# Patient Record
Sex: FEMALE | Race: OTHER | NOT HISPANIC OR LATINO
[De-identification: names, ages, dates, MRNs, and addresses within clinical notes are randomized per-mention and may not be internally consistent; named-entity substitution may affect disease eponyms.]

---

## 2018-05-16 PROBLEM — Z00.00 ENCOUNTER FOR PREVENTIVE HEALTH EXAMINATION: Status: ACTIVE | Noted: 2018-05-16

## 2018-05-18 ENCOUNTER — APPOINTMENT (OUTPATIENT)
Dept: OTOLARYNGOLOGY | Facility: CLINIC | Age: 48
End: 2018-05-18
Payer: COMMERCIAL

## 2018-05-18 VITALS
DIASTOLIC BLOOD PRESSURE: 71 MMHG | BODY MASS INDEX: 27.23 KG/M2 | WEIGHT: 148 LBS | HEIGHT: 62 IN | SYSTOLIC BLOOD PRESSURE: 106 MMHG | OXYGEN SATURATION: 99 % | TEMPERATURE: 98.2 F | HEART RATE: 61 BPM

## 2018-05-18 DIAGNOSIS — D57.3 SICKLE-CELL TRAIT: ICD-10-CM

## 2018-05-18 PROCEDURE — 31575 DIAGNOSTIC LARYNGOSCOPY: CPT

## 2018-05-18 PROCEDURE — 99204 OFFICE O/P NEW MOD 45 MIN: CPT | Mod: 25

## 2018-05-18 PROCEDURE — 76536 US EXAM OF HEAD AND NECK: CPT

## 2018-06-07 ENCOUNTER — APPOINTMENT (OUTPATIENT)
Dept: OTOLARYNGOLOGY | Facility: CLINIC | Age: 48
End: 2018-06-07
Payer: COMMERCIAL

## 2018-06-07 VITALS
RESPIRATION RATE: 15 BRPM | OXYGEN SATURATION: 98 % | SYSTOLIC BLOOD PRESSURE: 113 MMHG | DIASTOLIC BLOOD PRESSURE: 79 MMHG | HEART RATE: 96 BPM | TEMPERATURE: 98.9 F

## 2018-06-07 DIAGNOSIS — R22.1 LOCALIZED SWELLING, MASS AND LUMP, NECK: ICD-10-CM

## 2018-06-07 PROCEDURE — 99214 OFFICE O/P EST MOD 30 MIN: CPT | Mod: 25

## 2018-06-07 PROCEDURE — 10022: CPT

## 2018-06-07 PROCEDURE — 76942 ECHO GUIDE FOR BIOPSY: CPT

## 2018-07-12 ENCOUNTER — APPOINTMENT (OUTPATIENT)
Dept: OTOLARYNGOLOGY | Facility: CLINIC | Age: 48
End: 2018-07-12

## 2020-07-27 ENCOUNTER — APPOINTMENT (OUTPATIENT)
Dept: OTOLARYNGOLOGY | Facility: CLINIC | Age: 50
End: 2020-07-27
Payer: COMMERCIAL

## 2020-07-27 VITALS
HEART RATE: 58 BPM | DIASTOLIC BLOOD PRESSURE: 76 MMHG | OXYGEN SATURATION: 100 % | TEMPERATURE: 98.3 F | SYSTOLIC BLOOD PRESSURE: 119 MMHG

## 2020-07-27 PROCEDURE — 99214 OFFICE O/P EST MOD 30 MIN: CPT | Mod: 25

## 2020-07-27 PROCEDURE — 31575 DIAGNOSTIC LARYNGOSCOPY: CPT

## 2020-07-27 PROCEDURE — 10005 FNA BX W/US GDN 1ST LES: CPT

## 2020-07-27 NOTE — REASON FOR VISIT
[FreeTextEntry2] : a follow up regarding need for thyroidectomy for an enlarging goiter and left sided complex nodule associated with thyroiditis.  [FreeTextEntry1] : PCP is MD CLEO Shields, Endocrinologist is Jace Damian MD

## 2020-07-27 NOTE — PROCEDURE
[Image(s) Captured] : image(s) captured and filed [Unable to Cooperate with Mirror] : patient unable to cooperate with mirror [Gag Reflex] : gag reflex preventing mirror examination [Topical Lidocaine] : topical lidocaine [Flexible Endoscope] : examined with the flexible endoscope [Oxymetazoline HCl] : oxymetazoline HCl [Serial Number: ___] : Serial Number: [unfilled] [FreeTextEntry3] : ..............................................[facility]\par ........................................ULTRASOUND-GUIDED THYROID FINE NEEDLE ASPIRATION BIOPSY REPORT\par \par NAME: BEE SCHNEIDER.  MR# [id number]                : 1970               DATE: 2020  TIME: 6:10 PM\par \par HISTORY/ INDICATIONS: 49- year-old female with a new left lower lobe solid nodule with smooth margins and punctate echogenic foci within a multinodular goiter.\par \par EXAM: Real-time high-resolution ultrasound imaging of the thyroid gland was performed in the longitudinal and transverse planes with color power Doppler supplementation and image capture.\par \par FINDINGS: A left lower lobe solid nodule measuring 3.00 x 1.73  x  2.90 cm (longitudinal x AP x transverse) was identified and targeted for USG-FNA.  The nodule had the following ultrasonographic characteristics: solid, mildly hypoechoic, heterogeneous, smooth  margins, multiple punctate echogenic foci, grade 3 vascularity on color Doppler, and wider-than-tall.\par \par PROCEDURE: A time out took place and documented for correct patient identifiers, site and side of procedure.  After obtaining informed consent with discussion of risks, benefits and alternatives, the patient was positioned semi-supine, the skin was prepped with alcohol and 0.5 cc of 1% Lidocaine with 1:100,000 epinephrine was injected for local anesthesia. A #25-gauge needle was then passed along the perpendicular plane of the transducer, positioned within the nodule and confirmed with ultrasonography.  Multiple aspirations were made within the nodule with 4 separate needle punctures, four passes each.  Aspirates were directly placed into cytolyt solutions for cytologic analysis, immunohistochemistry, and in RNA preservative for possible molecular genomic diagnostics.  The patient tolerated the procedure well without complications and was discharged with signed post-procedure instructions indicating the importance of following up on all results. \par \par ASSESSMENT & PLAN: Successful USG-FNA of a left lower lobe complex nodule was well tolerated without complications. The patient will be contacted to review the cytologic findings as soon as available for further treatment planning.  A discussion took place with the patient who accepted the responsibility to call the office to review the cytology results if no communication occurs within two weeks. Follow-up imaging in 1 year is recommended if the cytology is reported as benign, [place] Category II.\par \par Electronically signed by MONTSERRAT LANDON M.D., FACS on 2020, 6:18 PM.\par \par [facility]: [address] [place] [address], [address],  [place] [address]\par [phone] (voice), [phone] (fax) [de-identified] : The nasal septum is minimally deviated to the right. There are no masses or polyps and the nasal mucosa and secretions are normal. The turbinates are congested. The choanae and posterior nasopharynx are normal without masses or drainage. The Eustachian tube orifices appear patent. The pharynx, including the posterior and lateral pharyngeal walls, the vallecula and base of tongue are normal without ulcerations, lesions or masses. The hypopharynx including the pyriform sinuses open well without pooling of secretions, mucosal lesions or masses. The supraglottic larynx including the epiglottis, petiole, glossoepiglottic folds and pharyngoepiglottic folds are normal without mucosal lesions, ulcerations or masses. The glottis reveals normal false vocal folds. The true vocal folds are glistening white, tense and of equal length, without paralysis, having symmetric mobility on adduction and abduction. There are no mucosal lesions, nodules, cysts, erythroplasia or leukoplakia. The posterior cricoid area has healthy pink mucosa in the interarytenoid area and esophageal inlet. There is mild thickening/edema of the interarytenoid mucosa suggestive of posterior laryngitis from laryngopharyngeal acid reflux disease. The trachea is clear without narrowing in the immediate subglottic region, without deviation or lesions. \par  [de-identified] : goiter and thyroiditis, GERD.

## 2020-07-27 NOTE — HISTORY OF PRESENT ILLNESS
[FreeTextEntry1] : Mindy had an ultrasound guided FNA biopsy of the dominant left mid-lower lobe complex nodule with ill-defined margins and several punctate echogenic foci identified on her last ultrasound exam in the office on 06/07/18. Her most recent f/u ultrasound again demonstrated numerous bilateral nodules but very difficult to compare to prior exams and the possibility of a new left lobe nodule > 2.5 cm with echogenic foci.  Mindy denies recent shortness of breath, voice changes, dysphagia, anterior neck pain, neck pressure or mass. She does admit to a intermittent choking sensation when lying flat over the past 2 years.  She is euthyroid. She denies fever, body aches, cough, cyanosis, chest burning, anosmia or recent known COVID exposures.  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  She was referred to Jace Damian for Endocrine consultation and he found a slight bilaterally enlarged thyroid gland with a "tender left lobe complex nodule" and no other focal solid or cystic nodule elsewhere in either lobe.  She had an USG FNA biopsy reported as benign, [place] II.   She has elevated anti-TPO antibodies and a mildly suppressed TSH in the past. Her weight has been stable. She is not taking thyroid hormone replacement.  Mindy denies recent shortness of breath, voice changes, dysphagia, anterior neck pain, neck pressure or mass. There is no family history of thyroid cancer. She denies any known radiation exposures in her youth. For the past several months she has had increased nasal congestion.  \par

## 2020-07-27 NOTE — DATA REVIEWED
[de-identified] : cytology report and Endocrine notes reviewed.  [de-identified] : see HPI [de-identified] : see HPI

## 2020-07-27 NOTE — CONSULT LETTER
[Consult Letter:] : I had the pleasure of evaluating your patient, [unfilled]. [Dear  ___] : Dear  [unfilled], [Sincerely,] : Sincerely, [Consult Closing:] : Thank you very much for allowing me to participate in the care of this patient.  If you have any questions, please do not hesitate to contact me. [Please see my note below.] : Please see my note below. [Dawood Cyr M.D., FACS, KAILA] : Dawood Cyr M.D., FACS, [facility] [Director, Center for Thyroid & Parathyroid Surgery] : Director, [facility] [[facility]] : [facility] [[facility]] : [facility]  [Certified in Neck Ultrasound/ ECNU & AIUM] : Certified in Neck Ultrasound/ ECNU & AIUM [[facility]] : [facility] [Otolaryngology/Head & Neck Surgery] : Otolaryngology/Head & Neck Surgery [] :  [DrWandy  ___] : Dr. AUGUSTINE [FreeTextEntry3] : \par Dawood Cyr M.D., FACS, [facility]\par Director Center for Thyroid & Parathyroid Surgery\par [facility] [place] [facility]\par Certified in Thyroid/Parathyroid/Neck Ultrasound, ECNU/ AIUM\par \par , Department of Otolaryngology\par [facility] at [facility]\par

## 2020-08-25 ENCOUNTER — APPOINTMENT (OUTPATIENT)
Dept: OTOLARYNGOLOGY | Facility: CLINIC | Age: 50
End: 2020-08-25

## 2021-08-03 ENCOUNTER — APPOINTMENT (OUTPATIENT)
Dept: OTOLARYNGOLOGY | Facility: CLINIC | Age: 51
End: 2021-08-03
Payer: COMMERCIAL

## 2021-08-03 VITALS
WEIGHT: 153 LBS | HEIGHT: 62 IN | DIASTOLIC BLOOD PRESSURE: 74 MMHG | OXYGEN SATURATION: 99 % | SYSTOLIC BLOOD PRESSURE: 105 MMHG | RESPIRATION RATE: 14 BRPM | TEMPERATURE: 97.9 F | HEART RATE: 66 BPM | BODY MASS INDEX: 28.16 KG/M2

## 2021-08-03 DIAGNOSIS — J30.1 ALLERGIC RHINITIS DUE TO POLLEN: ICD-10-CM

## 2021-08-03 DIAGNOSIS — Z00.00 ENCOUNTER FOR GENERAL ADULT MEDICAL EXAMINATION W/OUT ABNORMAL FINDINGS: ICD-10-CM

## 2021-08-03 DIAGNOSIS — Z83.3 FAMILY HISTORY OF DIABETES MELLITUS: ICD-10-CM

## 2021-08-03 PROCEDURE — 99214 OFFICE O/P EST MOD 30 MIN: CPT | Mod: 25

## 2021-08-03 PROCEDURE — 31575 DIAGNOSTIC LARYNGOSCOPY: CPT

## 2021-08-03 NOTE — HISTORY OF PRESENT ILLNESS
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  She was referred to Jace Damian for Endocrine consultation and he found a slight bilaterally enlarged thyroid gland with a "tender left lobe complex nodule" and no other focal solid or cystic nodule elsewhere in either lobe.  She had an USG FNA biopsy reported as benign, [place] II.   She has elevated anti-TPO antibodies and a mildly suppressed TSH in the past. Her weight has been stable. She is not taking thyroid hormone replacement.  Mindy denies recent shortness of breath, voice changes, dysphagia, anterior neck pain, neck pressure or mass. There is no family history of thyroid cancer. She denies any known radiation exposures in her youth. For the past several months she has had increased nasal congestion.  \par  [FreeTextEntry1] : Mindy is here for a f/u visit after she had an ultrasound guided FNA biopsy of the dominant left mid-lower lobe complex nodule with ill-defined margins and several punctate echogenic foci identified on her last ultrasound exam in the office on 06/07/18. Her last ultrasound in the office last year again demonstrated numerous bilateral nodules but very difficult to compare to prior exams and the possibility of a new 3 cm left lobe nodule with echogenic foci. She had a repeat FNA biopsy last year that was reported as benign, [place] category II. .  Mindy denies recent shortness of breath, voice changes, dysphagia, anterior neck pain, neck pressure or mass. She no longer has an intermittent choking sensation when lying flat.  She is euthyroid. She denies fever, body aches, cough, cyanosis, chest burning, anosmia or recent known COVID exposures.  All family members at home are well. She is not vaccinated. \par

## 2021-08-03 NOTE — PROCEDURE
[Image(s) Captured] : image(s) captured and filed [Unable to Cooperate with Mirror] : patient unable to cooperate with mirror [Gag Reflex] : gag reflex preventing mirror examination [Topical Lidocaine] : topical lidocaine [Oxymetazoline HCl] : oxymetazoline HCl [Flexible Endoscope] : examined with the flexible endoscope [Serial Number: ___] : Serial Number: [unfilled] [de-identified] : The nasal septum is minimally deviated to the right. There are no masses or polyps and the nasal mucosa and secretions are normal. The turbinates are congested. The choanae and posterior nasopharynx are normal without masses or drainage. The Eustachian tube orifices appear patent. The pharynx, including the posterior and lateral pharyngeal walls, the vallecula and base of tongue are normal without ulcerations, lesions or masses. The hypopharynx including the pyriform sinuses open well without pooling of secretions, mucosal lesions or masses. The supraglottic larynx including the epiglottis, petiole, glossoepiglottic folds and pharyngoepiglottic folds are normal without mucosal lesions, ulcerations or masses. The glottis reveals normal false vocal folds. The true vocal folds are glistening white, tense and of equal length, without paralysis, having symmetric mobility on adduction and abduction. There are no mucosal lesions, nodules, cysts, erythroplasia or leukoplakia. The posterior cricoid area has healthy pink mucosa in the interarytenoid area and esophageal inlet. There is slight thickening/edema of the interarytenoid mucosa suggestive of posterior laryngitis from laryngopharyngeal acid reflux disease. The trachea is clear without narrowing in the immediate subglottic region, without deviation or lesions. \par  [de-identified] : goiter,  thyroiditis, GERD.

## 2021-08-03 NOTE — DATA REVIEWED
[de-identified] : see HPI [de-identified] : see HPI [de-identified] : cytology report and Endocrine notes reviewed.

## 2021-08-03 NOTE — CONSULT LETTER
[Dear  ___] : Dear  [unfilled], [Consult Letter:] : I had the pleasure of evaluating your patient, [unfilled]. [Please see my note below.] : Please see my note below. [Consult Closing:] : Thank you very much for allowing me to participate in the care of this patient.  If you have any questions, please do not hesitate to contact me. [Sincerely,] : Sincerely, [DrWandy  ___] : Dr. AUGUSTINE [Dawood Cyr M.D., FACS, [facility]] : Dawood Cyr M.D., FACS, [facility] [Director, Center [facility]athyroid [facility]] : Director, [facility] [[facility]] : [facility] [[facility]] : [facility]  [Certified in Neck Ultrasound/ ECNU & AIUM] : Certified in Neck Ultrasound/ ECNU & AIUM [] :  [Otolaryngology/Head & Neck Surgery] : Otolaryngology/Head & Neck Surgery [[facility]] : [facility] [FreeTextEntry3] : \par Dawood Cyr M.D., FACS, [facility]\par Director Center for Thyroid & Parathyroid Surgery\par [facility] [place] [facility]\par Certified in Thyroid/Parathyroid/Neck Ultrasound, ECNU/ AIUM\par \par , Department of Otolaryngology\par [facility] at [facility]\par

## 2021-09-08 ENCOUNTER — NON-APPOINTMENT (OUTPATIENT)
Age: 51
End: 2021-09-08

## 2021-09-08 ENCOUNTER — APPOINTMENT (OUTPATIENT)
Dept: ENDOCRINOLOGY | Facility: CLINIC | Age: 51
End: 2021-09-08
Payer: COMMERCIAL

## 2021-09-08 VITALS
WEIGHT: 155 LBS | BODY MASS INDEX: 28.35 KG/M2 | SYSTOLIC BLOOD PRESSURE: 101 MMHG | DIASTOLIC BLOOD PRESSURE: 59 MMHG | HEART RATE: 59 BPM

## 2021-09-08 PROCEDURE — 99204 OFFICE O/P NEW MOD 45 MIN: CPT

## 2021-09-08 NOTE — PHYSICAL EXAM
[Alert] : alert [Normal Sclera/Conjunctiva] : normal sclera/conjunctiva [Normal Outer Ear/Nose] : the ears and nose were normal in appearance [No Respiratory Distress] : no respiratory distress [Normal Rate] : heart rate was normal [No Edema] : no peripheral edema [Normal Bowel Sounds] : normal bowel sounds [Spine Straight] : spine straight [No Stigmata of Cushings Syndrome] : no stigmata of Cushings Syndrome [Normal Gait] : normal gait [Normal Reflexes] : deep tendon reflexes were 2+ and symmetric [Oriented x3] : oriented to person, place, and time

## 2021-09-10 NOTE — HISTORY OF PRESENT ILLNESS
[FreeTextEntry1] : 49 y/o F pt, formed pt of Dr. Damian, with Hx of thyroid nodules (dx in ~2013), presents today for thyroid evaluation. \par No Hx of head and neck radiation exposure during childhood. \par FHx: Pt has 2 stepsisters. Denies FHx of thyroid disorder. \par SHx: Non smoker. No EtOH use. \par NKDA\par \par 09/08/2021\par - She is a former pt of Dr. Damian. Had initial visit with Dr. Cyr on 5/18/18. Hx of enlarged b/l thyroid that contains nodules bilaterally, She had 3 FNA biopsies to L lower pole nodule. \par \par Pt is not aware of when it was but she had MRI done with her PCP and was dx with thyroid incidentaloma and she was referred to see specialist Dr. Damian in ~2013- 2014. \par \par Pt presents today with /59 and BMI 28.35, feeling fine, with no major physical complaints. She reportedly had TFTs done 2 months ago. Her menses are regular. \par Denies speech, swallowing or breathing difficulties. \par \par Current Medications: none\par \par Labs: \par - 07/27/20 FNA L lower 3 cm nodule: [place] II\par - 04/24/20 Thyroid US: R thyroid lobe is borderline enlarged with 3 nodules. L thyroid lobe is enlarged measuring 7.7 x 1.8 x 2.9 cm with minimally heterogenous echotexture of normal vascularity.  Upper pole measures 0.3 x 0.2 x 0.3 cm. well-circumscribed, wider than tall, echo poor gas cystic and avascular. Mid to lower pole nodule measures 2.6 x 2.0 x 2.21 cm is heterogenous, tall than wide with hyperechoic, isoechoic and a few cystic compliments, TIny echoic punctate foci suggestive of calcification and/or colloid crystals. Lower pole nodule measures 2.9 x 1.9 x 1.8 cm is directly adjacent to the aforementioned nodule, well-circumscribed, wider than tall, heterogenous, overall nearly isoechoic with a small cystic component. This has a few punctate echogenic foci and a well circumscribed thin lucent rim. No suspicious vascularity. \par - 06/07/18 FNA L lower 3.46 cm nodule: [place] II\par - 03/07/18: TSH 0.586, Free T4 0.81, Vit D 25-OH 39, iPTH 50.1, Ca 8.8\par - 12/28/17: TSH 0.44, A1c 4.5%, s.creat 0.67, LDL-c 84, \par - 05/05/17 FNA L lateral thyroid: Negative for malignancy. Scant groups of benign follicular cells.

## 2021-09-10 NOTE — ADDENDUM
[FreeTextEntry1] : I, Shannon Baugh, acted solely as a scribe for Dr. Hector Chiang on this date. 09/08/2021.

## 2021-09-10 NOTE — END OF VISIT
[FreeTextEntry3] : All medical record entries made by the Scribe were at my, Dr. Hector Chiang, direction and personally dictated by me on 09/08/2021. I have reviewed the chart and agree that the record accurately reflects my personal performance of the history, physical exam, assessment and plan. I have also personally directed, reviewed and agreed with the chart.  [Time Spent: ___ minutes] : I have spent [unfilled] minutes of time on the encounter.

## 2021-09-10 NOTE — ASSESSMENT
[FreeTextEntry1] : 49 y/o F pt with:\par \par 1. Multinodular goiter apparently dx in 2013:\par Pt is clinically euthyroid. \par She has been seen by ENT Dr. Cyr. Last visit on 8/3/21. Laryngoscopy was normal with mild posterior laryngitis. \par She had 3 FNA biopsies for L lower pole nodule. FNA biopsies done in 2018 and 2020 were [place] II. \par No symptoms of upper respiratory obstructions or speech complaint.s \par Natural Hx of thyroid explained. \par Ordered TFTs and thyroid US. \par \par Return in: 2 months

## 2021-09-10 NOTE — REVIEW OF SYSTEMS
[Negative] : Heme/Lymph [Dysphagia] : no dysphagia [Dysphonia] : no dysphonia [Difficulty Breathing] : no dyspnea [Irregular Menses] : regular menses

## 2021-09-21 ENCOUNTER — OUTPATIENT (OUTPATIENT)
Dept: OUTPATIENT SERVICES | Facility: HOSPITAL | Age: 51
LOS: 1 days | End: 2021-09-21
Payer: COMMERCIAL

## 2021-09-21 ENCOUNTER — APPOINTMENT (OUTPATIENT)
Dept: ULTRASOUND IMAGING | Facility: HOSPITAL | Age: 51
End: 2021-09-21
Payer: COMMERCIAL

## 2021-09-21 PROCEDURE — 76536 US EXAM OF HEAD AND NECK: CPT | Mod: 26

## 2021-09-21 PROCEDURE — 76536 US EXAM OF HEAD AND NECK: CPT

## 2021-10-20 ENCOUNTER — APPOINTMENT (OUTPATIENT)
Dept: ENDOCRINOLOGY | Facility: CLINIC | Age: 51
End: 2021-10-20
Payer: COMMERCIAL

## 2021-10-20 VITALS
BODY MASS INDEX: 28.9 KG/M2 | HEART RATE: 73 BPM | DIASTOLIC BLOOD PRESSURE: 68 MMHG | SYSTOLIC BLOOD PRESSURE: 106 MMHG | WEIGHT: 158 LBS

## 2021-10-20 PROCEDURE — 99214 OFFICE O/P EST MOD 30 MIN: CPT

## 2021-10-22 NOTE — HISTORY OF PRESENT ILLNESS
[FreeTextEntry1] : 52 y/o F pt, former pt of Dr. Damian, with Hx of thyroid nodules (dx in ~2013), presents today for thyroid evaluation. \par No Hx of head and neck radiation exposure during childhood. \par FHx: Pt has 2 stepsisters. Denies FHx of thyroid disorder. \par SHx: Non smoker. No EtOH use. \par NKDA\par \par 09/08/2021\par - She is a former pt of Dr. Damian. Had initial visit with Dr. Cyr on 5/18/18. Hx of enlarged b/l thyroid that contains nodules bilaterally, She had 3 FNA biopsies to L lower pole nodule. \par \par Pt is not aware of when it was but she had MRI done with her PCP and was dx with thyroid incidentaloma and she was referred to see specialist Dr. Damian in ~2013- 2014. \par \par Pt presents today with /59 and BMI 28.35, feeling fine, with no major physical complaints. She reportedly had TFTs done 2 months ago. Her menses are regular. \par Denies speech, swallowing or breathing difficulties.\par \par 10/20/21\par The patient presents today for endocrine f/u. She is feeling generally well. She denies dysphagia, dyspnea, or recent voice changes. She had a thyroid US done 09/29/21, results noted below.\par \par Current Medications: none\par \par Labs:\par - 09/21/21 Thyroid US: Right lobe with 3 nodules; 2 are smaller than 1 cm and in the mid-pole, there is a 1.7  x 1.1 x 1.1 cm mixed solid nodule. Lymph node: the size of the node is large (7.2 x 2.9 x 2.8 cm) and in the mid lower pole there is a large 3.3 x 2.7 x 2.6 cm solid hypoechoic nodule. The isthmus contains no nodules, no abnormal lymph nodes are identified in the neck.\par - 07/27/20 FNA L lower 3 cm nodule: [place] II\par - 04/24/20 Thyroid US: R thyroid lobe is borderline enlarged with 3 nodules. L thyroid lobe is enlarged measuring 7.7 x 1.8 x 2.9 cm with minimally heterogenous echotexture of normal vascularity.  Upper pole measures 0.3 x 0.2 x 0.3 cm. well-circumscribed, wider than tall, echo poor gas cystic and avascular. Mid to lower pole nodule measures 2.6 x 2.0 x 2.21 cm is heterogenous, tall than wide with hyperechoic, isoechoic and a few cystic compliments, TIny echoic punctate foci suggestive of calcification and/or colloid crystals. Lower pole nodule measures 2.9 x 1.9 x 1.8 cm is directly adjacent to the aforementioned nodule, well-circumscribed, wider than tall, heterogenous, overall nearly isoechoic with a small cystic component. This has a few punctate echogenic foci and a well circumscribed thin lucent rim. No suspicious vascularity. \par - 06/07/18 FNA L lower 3.46 cm nodule: [place] II\par - 03/07/18: TSH 0.586, Free T4 0.81, Vit D 25-OH 39, iPTH 50.1, Ca 8.8\par - 12/28/17: TSH 0.44, A1c 4.5%, s.creat 0.67, LDL-c 84\par - 05/05/17 FNA L lateral thyroid: Negative for malignancy. Scant groups of benign follicular cells.

## 2021-10-22 NOTE — PHYSICAL EXAM
[Alert] : alert [Normal Sclera/Conjunctiva] : normal sclera/conjunctiva [Normal Outer Ear/Nose] : the ears and nose were normal in appearance [No Respiratory Distress] : no respiratory distress [Normal Rate] : heart rate was normal [No Edema] : no peripheral edema [Normal Bowel Sounds] : normal bowel sounds [Spine Straight] : spine straight [No Stigmata of Cushings Syndrome] : no stigmata of Cushings Syndrome [Normal Gait] : normal gait [Normal Reflexes] : deep tendon reflexes were 2+ and symmetric [Oriented x3] : oriented to person, place, and time [de-identified] : Left lobe is enlarged and left nodule was palpated

## 2021-10-22 NOTE — ADDENDUM
[FreeTextEntry1] : All medical record entries made by the Scribe were at my, Dr. Hector Chiang, direction and personally dictated by me on 10/20/2021. I have reviewed the chart and agree that the record accurately reflects my personal performance of the history, physical exam, assessment and plan. I have also personally directed, reviewed, and agreed with the chart.

## 2021-10-22 NOTE — ASSESSMENT
[FreeTextEntry1] : 52 y/o F pt with:\par \par 1. Multinodular goiter apparently dx in 2013:\par Pt is clinically euthyroid. She denies symptoms of swallowing, breathing, voice changes.\par Recent US shows left mid-lower nodule that measured 3.3 x 2.7 x 2.6 cm and suspicious,  therefore was sent for FNA biopsy. ( Previous FNA [place] II on 7,2020 : 3 x 1.7 x 2.9 cm ) \par Will call patient with results.\par \par Return in: 2 months

## 2021-10-22 NOTE — REVIEW OF SYSTEMS
[As Noted in HPI] : as noted in HPI [Negative] : Endocrine [Recent Weight Gain (___ Lbs)] : no recent weight gain [Dysphagia] : no dysphagia [Dysphonia] : no dysphonia [Difficulty Breathing] : no dyspnea [Irregular Menses] : regular menses

## 2021-10-22 NOTE — END OF VISIT
[FreeTextEntry3] : Documented by Daniel Vegas acting as a scribe for Dr. Hector Chiang on 10/20/2021. [Time Spent: ___ minutes] : I have spent [unfilled] minutes of time on the encounter.

## 2021-11-17 ENCOUNTER — RESULT REVIEW (OUTPATIENT)
Age: 51
End: 2021-11-17

## 2021-11-17 ENCOUNTER — OUTPATIENT (OUTPATIENT)
Dept: OUTPATIENT SERVICES | Facility: HOSPITAL | Age: 51
LOS: 1 days | End: 2021-11-17
Payer: COMMERCIAL

## 2021-11-17 ENCOUNTER — APPOINTMENT (OUTPATIENT)
Dept: ULTRASOUND IMAGING | Facility: HOSPITAL | Age: 51
End: 2021-11-17

## 2021-11-17 PROCEDURE — 88305 TISSUE EXAM BY PATHOLOGIST: CPT

## 2021-11-17 PROCEDURE — 10005 FNA BX W/US GDN 1ST LES: CPT

## 2021-11-17 PROCEDURE — 88173 CYTOPATH EVAL FNA REPORT: CPT | Mod: 26

## 2021-11-17 PROCEDURE — 88305 TISSUE EXAM BY PATHOLOGIST: CPT | Mod: 26

## 2021-11-17 PROCEDURE — 88173 CYTOPATH EVAL FNA REPORT: CPT

## 2021-11-24 LAB
ALBUMIN SERPL ELPH-MCNC: 3.9 G/DL
ALP BLD-CCNC: 43 U/L
ALT SERPL-CCNC: 15 U/L
ANION GAP SERPL CALC-SCNC: 10 MMOL/L
AST SERPL-CCNC: 18 U/L
BILIRUB SERPL-MCNC: 0.3 MG/DL
BUN SERPL-MCNC: 12 MG/DL
CALCIUM SERPL-MCNC: 8.9 MG/DL
CHLORIDE SERPL-SCNC: 105 MMOL/L
CO2 SERPL-SCNC: 24 MMOL/L
CREAT SERPL-MCNC: 0.69 MG/DL
GLUCOSE SERPL-MCNC: 80 MG/DL
POTASSIUM SERPL-SCNC: 4.2 MMOL/L
PROT SERPL-MCNC: 7.1 G/DL
SODIUM SERPL-SCNC: 138 MMOL/L
T4 FREE SERPL-MCNC: 1.2 NG/DL
THYROGLOB AB SERPL-ACNC: <20 IU/ML
THYROPEROXIDASE AB SERPL IA-ACNC: 14.3 IU/ML
TSH SERPL-ACNC: 1.1 UIU/ML

## 2022-08-02 ENCOUNTER — APPOINTMENT (OUTPATIENT)
Dept: OTOLARYNGOLOGY | Facility: CLINIC | Age: 52
End: 2022-08-02

## 2022-08-02 VITALS
RESPIRATION RATE: 18 BRPM | OXYGEN SATURATION: 100 % | BODY MASS INDEX: 27.6 KG/M2 | DIASTOLIC BLOOD PRESSURE: 70 MMHG | HEART RATE: 56 BPM | WEIGHT: 150 LBS | SYSTOLIC BLOOD PRESSURE: 105 MMHG | TEMPERATURE: 98.1 F | HEIGHT: 62 IN

## 2022-08-02 DIAGNOSIS — K21.9 GASTRO-ESOPHAGEAL REFLUX DISEASE W/OUT ESOPHAGITIS: ICD-10-CM

## 2022-08-02 DIAGNOSIS — E04.2 NONTOXIC MULTINODULAR GOITER: ICD-10-CM

## 2022-08-02 DIAGNOSIS — D44.0 NEOPLASM OF UNCERTAIN BEHAVIOR OF THYROID GLAND: ICD-10-CM

## 2022-08-02 PROCEDURE — 99215 OFFICE O/P EST HI 40 MIN: CPT | Mod: 25

## 2022-08-02 PROCEDURE — 31575 DIAGNOSTIC LARYNGOSCOPY: CPT

## 2022-08-02 NOTE — PROCEDURE
[Image(s) Captured] : image(s) captured and filed [Unable to Cooperate with Mirror] : patient unable to cooperate with mirror [Gag Reflex] : gag reflex preventing mirror examination [Topical Lidocaine] : topical lidocaine [Oxymetazoline HCl] : oxymetazoline HCl [Flexible Endoscope] : examined with the flexible endoscope [Serial Number: ___] : Serial Number: [unfilled] [FreeTextEntry3] : see US images [de-identified] : The nasal septum is minimally deviated to the right. There are no masses or polyps and the nasal mucosa and secretions are normal. The turbinates are congested. The choanae and posterior nasopharynx are normal without masses or drainage. The Eustachian tube orifices appear patent. The pharynx, including the posterior and lateral pharyngeal walls, the vallecula and base of tongue are normal without ulcerations, lesions or masses. The hypopharynx including the pyriform sinuses open well without pooling of secretions, mucosal lesions or masses. The supraglottic larynx including the epiglottis, petiole, glossoepiglottic folds and pharyngoepiglottic folds are normal without mucosal lesions, ulcerations or masses. The glottis reveals normal false vocal folds. The true vocal folds are glistening white, tense and of equal length, without paralysis, having symmetric mobility on adduction and abduction. There are no mucosal lesions, nodules, cysts, erythroplasia or leukoplakia. The posterior cricoid area has healthy pink mucosa in the interarytenoid area and esophageal inlet. There is mild thickening/edema of the interarytenoid mucosa suggestive of posterior laryngitis from laryngopharyngeal acid reflux disease. The trachea is clear without narrowing in the immediate subglottic region, without deviation or lesions. \par  [de-identified] : goiter,  thyroiditis, GERD.

## 2022-08-02 NOTE — DATA REVIEWED
[de-identified] : see HPI [de-identified] : see HPI [de-identified] : last cytology report and Endocrine notes reviewed.

## 2022-08-02 NOTE — CONSULT LETTER
[Dear  ___] : Dear  [unfilled], [Consult Letter:] : I had the pleasure of evaluating your patient, [unfilled]. [Please see my note below.] : Please see my note below. [Consult Closing:] : Thank you very much for allowing me to participate in the care of this patient.  If you have any questions, please do not hesitate to contact me. [Sincerely,] : Sincerely, [DrWandy  ___] : Dr. AUGUSTINE [Dawood Cyr M.D., FACS, [facility]] : Dawood Cyr M.D., FACS, [facility] [Director, [facility]] : Director, [facility] [[facility]] : [facility] [[facility]] : [facility]  [Certified in Neck Ultrasound/ ECNU & AIUM] : Certified in Neck Ultrasound/ ECNU & AIUM [] :  [Otolaryngology/Head & Neck Surgery] : Otolaryngology/Head & Neck Surgery [[facility]] : [facility] [FreeTextEntry3] : \par Dawood Cyr M.D., FACS, ECNU\par Director [facility] Parathyroid [facility]\par [facility] [place] [facility]\par Certified in Thyroid/Parathyroid/Neck Ultrasound, ECNU/ AIUM\par \par , Department of Otolaryngology\par [facility] at [facility]\par

## 2022-08-02 NOTE — HISTORY OF PRESENT ILLNESS
[de-identified] : Mindy is a generally healthy 47-year-old female who does clerical work for the [employer] and was noted by her PCP last year on a routine physical exam to have a enlarged thyroid gland.  She was referred to Jace Damian for Endocrine consultation and he found a slight bilaterally enlarged thyroid gland with a "tender left lobe complex nodule" and no other focal solid or cystic nodule elsewhere in either lobe.  She had an USG FNA biopsy reported as benign, [place] II.   She has elevated anti-TPO antibodies and a mildly suppressed TSH in the past. Her weight has been stable. She is not taking thyroid hormone replacement.  Mindy denies recent shortness of breath, voice changes, dysphagia, anterior neck pain, neck pressure or mass. There is no family history of thyroid cancer. She denies any known radiation exposures in her youth. For the past several months she has had increased nasal congestion.  \par  [FreeTextEntry1] : Mindy is here for a f/u visit after she had an ultrasound guided FNA biopsy of the dominant left mid-lower lobe complex nodule with ill-defined margins and several punctate echogenic foci identified on her last ultrasound exam in the office on 06/07/18. Her last ultrasound in the office last year again demonstrated numerous bilateral nodules but very difficult to compare to prior exams and the possibility of a new 3 cm left lobe nodule with echogenic foci. She had a repeat FNA biopsy last year that was reported as benign, [place] category II. A repeat FNAB was done last November for a 3.3 cm left mid-lower lobe nodule and again was benign, [place] category II.  Mindy denies recent shortness of breath, voice changes, dysphagia, anterior neck pain, neck pressure or mass. She denies an intermittent choking sensation when lying flat.  She is euthyroid. She denies fever, body aches, cough, cyanosis, chest burning, anosmia or recent known COVID exposures.  All family members at home are well. She is now vaccinated but not boosted.

## 2022-08-04 LAB
25(OH)D3 SERPL-MCNC: 25 NG/ML
T4 FREE SERPL-MCNC: 1.1 NG/DL
TSH SERPL-ACNC: 0.67 UIU/ML

## 2024-08-01 ENCOUNTER — APPOINTMENT (OUTPATIENT)
Dept: OTOLARYNGOLOGY | Facility: CLINIC | Age: 54
End: 2024-08-01
Payer: COMMERCIAL

## 2024-08-01 VITALS
SYSTOLIC BLOOD PRESSURE: 134 MMHG | DIASTOLIC BLOOD PRESSURE: 77 MMHG | TEMPERATURE: 98 F | OXYGEN SATURATION: 99 % | HEIGHT: 62 IN | WEIGHT: 153 LBS | BODY MASS INDEX: 28.16 KG/M2 | HEART RATE: 66 BPM

## 2024-08-01 DIAGNOSIS — D44.0 NEOPLASM OF UNCERTAIN BEHAVIOR OF THYROID GLAND: ICD-10-CM

## 2024-08-01 DIAGNOSIS — K21.9 GASTRO-ESOPHAGEAL REFLUX DISEASE W/OUT ESOPHAGITIS: ICD-10-CM

## 2024-08-01 DIAGNOSIS — E04.2 NONTOXIC MULTINODULAR GOITER: ICD-10-CM

## 2024-08-01 DIAGNOSIS — R22.1 LOCALIZED SWELLING, MASS AND LUMP, NECK: ICD-10-CM

## 2024-08-01 PROCEDURE — 99214 OFFICE O/P EST MOD 30 MIN: CPT | Mod: 25

## 2024-08-01 PROCEDURE — 31575 DIAGNOSTIC LARYNGOSCOPY: CPT

## 2024-08-01 NOTE — END OF VISIT
[TextEntry] : I have spent 35 min of time for the encounter exclusive of any procedures performed during the visit.

## 2024-08-01 NOTE — PROCEDURE
[Image(s) Captured] : image(s) captured and filed [Unable to Cooperate with Mirror] : patient unable to cooperate with mirror [Gag Reflex] : gag reflex preventing mirror examination [Topical Lidocaine] : topical lidocaine [Oxymetazoline HCl] : oxymetazoline HCl [Flexible Endoscope] : examined with the flexible endoscope [Serial Number: ___] : Serial Number: [unfilled] [de-identified] : Verbal informed consent was obtained for fiber optic laryngoscopy.  Findings: The nasal septum is minimally deviated to the right. There are no masses or polyps, and the nasal mucosa and secretions are normal. The turbinates are congested. The choanae and posterior nasopharynx are normal without masses or drainage. The Eustachian tube orifices appear patent. The pharynx, including the posterior and lateral pharyngeal walls, the vallecula and base of tongue are normal without ulcerations, lesions or masses. The hypopharynx including the pyriform sinuses open well without pooling of secretions, mucosal lesions or masses. The supraglottic larynx including the epiglottis, petiole, glossoepiglottic folds and pharyngoepiglottic folds are normal without mucosal lesions, ulcerations or masses. The glottis reveals normal false vocal folds. The true vocal folds are glistening white, tense and of equal length, without paralysis, having symmetric mobility on adduction and abduction. There are no mucosal lesions, nodules, cysts, erythroplasia or leukoplakia. The posterior cricoid area has healthy pink mucosa in the interarytenoid area and esophageal inlet. There is mild thickening/edema of the interarytenoid mucosa suggestive of posterior laryngitis from laryngopharyngeal acid reflux disease. The trachea is clear without narrowing in the immediate subglottic region, without deviation or lesions.  -------------------------------------------------------------------------------------------------------------------------------------------------------------------------------------   [de-identified] : goiter,  thyroiditis, GERD.

## 2024-08-01 NOTE — CONSULT LETTER
[Dear  ___] : Dear  [unfilled], [Consult Letter:] : I had the pleasure of evaluating your patient, [unfilled]. [Please see my note below.] : Please see my note below. [Consult Closing:] : Thank you very much for allowing me to participate in the care of this patient.  If you have any questions, please do not hesitate to contact me. [Sincerely,] : Sincerely, [DrWandy  ___] : Dr. AUGUSTINE [Dawood Cyr M.D., FACS, [facility]] : Dawood Cyr M.D., FACS, On license of [facility] [Director, [facility]] : Director, [facility] [[facility] [place]] : [facility] [[facility]] : [facility]  [Certified in Neck Ultrasound/ ECNU & AIUM] : Certified in Neck Ultrasound/ ECNU & AIUM [] :  [Otolaryngology/Head & Neck Surgery] : Otolaryngology/Head & Neck Surgery [[facility]] : [facility] [FreeTextEntry3] : \par  Dawood Cyr M.D., FACS, ECNU\par  Director Center for Thyroid & Parathyroid Surgery\par  [facility] [place] [facility]\par  Certified in Thyroid/Parathyroid/Neck Ultrasound, ECNU/ AIUM\par  \par  , Department of Otolaryngology\par  [facility] at [facility]\par

## 2024-08-01 NOTE — HISTORY OF PRESENT ILLNESS
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 and was noted by her PCP last year on a routine physical exam to have a enlarged thyroid gland.  She was referred to Jace Damian for Endocrine consultation and he found a slight bilaterally enlarged thyroid gland with a "tender left lobe complex nodule" and no other focal solid or cystic nodule elsewhere in either lobe.  She had an USG FNA biopsy reported as benign, [place] II.   She has elevated anti-TPO antibodies and a mildly suppressed TSH in the past. Her weight has been stable. She is not taking thyroid hormone replacement.  Mindy denies recent shortness of breath, voice changes, dysphagia, anterior neck pain, neck pressure or mass. There is no family history of thyroid cancer. She denies any known radiation exposures in her youth. For the past several months she has had increased nasal congestion.  -------------------------------------------------------------------------------------------------------------------------------------------------------------------------------------    [FreeTextEntry1] : Mindy is here for a f/u visit after she had an ultrasound guided FNA biopsy of the dominant left mid-lower lobe complex nodule with ill-defined margins and several punctate echogenic foci identified on her last ultrasound exam in the office on 06/07/18. Her last ultrasound in the office in 2022 demonstrated numerous bilateral nodules but very difficult to compare to prior exams and the possibility of a new 3 cm left lobe nodule with echogenic foci. She had a repeat FNA biopsy in 2021 that was reported as benign, [place] category II. A repeat FNAB was done for a 3.3 cm left mid-lower lobe nodule and again was benign, [place] category II.  Mindy denies recent shortness of breath, voice changes, dysphagia, anterior neck pain, neck pressure or mass. She denies an intermittent choking sensation when lying flat.  She is euthyroid. She denies fever, body aches, cough, cyanosis, chest burning, anosmia or recent known COVID exposures.  All family members at home are well. She is now vaccinated but not boosted.

## 2024-08-01 NOTE — DATA REVIEWED
[de-identified] : see HPI [de-identified] : see HPI [de-identified] : last cytology report and Endocrine notes reviewed.

## 2024-08-06 LAB
25(OH)D3 SERPL-MCNC: 19.6 NG/ML
T4 FREE SERPL-MCNC: 1.2 NG/DL
TSH SERPL-ACNC: 0.62 UIU/ML

## 2024-09-09 ENCOUNTER — APPOINTMENT (OUTPATIENT)
Dept: ULTRASOUND IMAGING | Facility: CLINIC | Age: 54
End: 2024-09-09

## 2024-09-16 ENCOUNTER — APPOINTMENT (OUTPATIENT)
Dept: ULTRASOUND IMAGING | Facility: CLINIC | Age: 54
End: 2024-09-16

## 2025-07-28 ENCOUNTER — NON-APPOINTMENT (OUTPATIENT)
Age: 55
End: 2025-07-28

## 2025-07-28 ENCOUNTER — APPOINTMENT (OUTPATIENT)
Dept: OTOLARYNGOLOGY | Facility: CLINIC | Age: 55
End: 2025-07-28
Payer: COMMERCIAL

## 2025-07-28 VITALS
HEIGHT: 62 IN | DIASTOLIC BLOOD PRESSURE: 64 MMHG | SYSTOLIC BLOOD PRESSURE: 99 MMHG | BODY MASS INDEX: 28.52 KG/M2 | WEIGHT: 155 LBS | HEART RATE: 75 BPM | OXYGEN SATURATION: 99 % | TEMPERATURE: 98 F

## 2025-07-28 DIAGNOSIS — K21.9 GASTRO-ESOPHAGEAL REFLUX DISEASE W/OUT ESOPHAGITIS: ICD-10-CM

## 2025-07-28 DIAGNOSIS — D44.0 NEOPLASM OF UNCERTAIN BEHAVIOR OF THYROID GLAND: ICD-10-CM

## 2025-07-28 DIAGNOSIS — E04.2 NONTOXIC MULTINODULAR GOITER: ICD-10-CM

## 2025-07-28 PROCEDURE — 99214 OFFICE O/P EST MOD 30 MIN: CPT | Mod: 25

## 2025-07-28 PROCEDURE — 31575 DIAGNOSTIC LARYNGOSCOPY: CPT

## 2025-08-05 ENCOUNTER — APPOINTMENT (OUTPATIENT)
Dept: ULTRASOUND IMAGING | Facility: HOSPITAL | Age: 55
End: 2025-08-05
Payer: COMMERCIAL

## 2025-08-05 ENCOUNTER — OUTPATIENT (OUTPATIENT)
Dept: OUTPATIENT SERVICES | Facility: HOSPITAL | Age: 55
LOS: 1 days | End: 2025-08-05

## 2025-08-05 PROCEDURE — 76536 US EXAM OF HEAD AND NECK: CPT

## 2025-08-05 PROCEDURE — 76536 US EXAM OF HEAD AND NECK: CPT | Mod: 26

## 2025-08-06 LAB
25(OH)D3 SERPL-MCNC: 36.7 NG/ML
T4 FREE SERPL-MCNC: 1.1 NG/DL
TSH SERPL-ACNC: 0.58 UIU/ML

## 2025-08-22 ENCOUNTER — APPOINTMENT (OUTPATIENT)
Dept: OTOLARYNGOLOGY | Facility: CLINIC | Age: 55
End: 2025-08-22
Payer: COMMERCIAL

## 2025-08-22 VITALS
WEIGHT: 155 LBS | HEART RATE: 67 BPM | DIASTOLIC BLOOD PRESSURE: 85 MMHG | BODY MASS INDEX: 28.52 KG/M2 | OXYGEN SATURATION: 98 % | HEIGHT: 62 IN | TEMPERATURE: 98.3 F | SYSTOLIC BLOOD PRESSURE: 120 MMHG

## 2025-08-22 DIAGNOSIS — E04.2 NONTOXIC MULTINODULAR GOITER: ICD-10-CM

## 2025-08-22 DIAGNOSIS — K21.9 GASTRO-ESOPHAGEAL REFLUX DISEASE W/OUT ESOPHAGITIS: ICD-10-CM

## 2025-08-22 DIAGNOSIS — D44.0 NEOPLASM OF UNCERTAIN BEHAVIOR OF THYROID GLAND: ICD-10-CM

## 2025-08-22 PROCEDURE — 10005 FNA BX W/US GDN 1ST LES: CPT

## 2025-08-22 PROCEDURE — 99215 OFFICE O/P EST HI 40 MIN: CPT | Mod: 25
